# Patient Record
Sex: FEMALE | Race: BLACK OR AFRICAN AMERICAN
[De-identification: names, ages, dates, MRNs, and addresses within clinical notes are randomized per-mention and may not be internally consistent; named-entity substitution may affect disease eponyms.]

---

## 2017-04-19 NOTE — PD
HPI


Chief Complaint


vag bleeding


Date Seen:  2017


Travel History


International Travel<30 Days:  No


Contact w/Intl Traveler<30Days:  No


Known Affected Area:  No





History of Present Illness


HPI


Pt is a 21 year old G1 with IUP at 18 wks who presents with c/o vag bleeding on 

and off for several weeks.  Pt states last intercourse 1 week ago.  Pt reports 

bleeding is light, pinkish, only with wiping.  denies cramping at present.  

reports h/o chlamydia earlier this pregnancy, treated


Para:  0


:  1





History


Past Surgical History


Surgical History:  No Previous Surgery





Social History


Alcohol Use:  No


Tobacco Use:  No


Substance Abuse:  No





Allergies-Medications


(Allergen,Severity, Reaction):  


Coded Allergies:  


     No Known Allergies (Unverified , 17)


Narrative Medication


prenatal vitamins





Review of Systems


General / Constitutional:  No: Fever, Weight Gain, Weight Loss, Chills, Other


Eyes:  No: Diploplia, Blurred Vision, Visual changes, Pain, Photophobia, Other


HENT:  No: Headaches, Vertigo, Dental Difficulties, Lightheadedness, Other


Cardiovascular:  No: Irregular Rhythm, Chest Pain or Discomfort, Palpitations, 

Tachycardia, Syncope, Varicosities, Edema, Cyanosis, Other


Respiratory:  No: Cough, Short of Breath, Wheezing, Other


Gastrointestinal:  No: Nausea, Vomiting, Diarrhea, Abdominal Pain, Hematemesis, 

Hematochezia, Constipation, Changes in Bowel Habits, Indigestion, Loss of 

Appetite, Other


Genitourinary:  No: Urgency, Frequency, Dysuria, Nocturia, Hematuria, Decreased 

Urinary Output, Oliguria, Hesitancy, Dribbling, Incontinence, Pelvic Pain, 

Dyspareunia, Discharge, Menorrhagia, Vaginal Bleeding, Other


Musculoskeletal:  No: Limited ROM, Weakness, Cramping, Edema, Pain, Other


Skin:  No Rash, No Itching, No Dryness, No Lumps, No Change in Pigmentation, No 

Change in Nails, No Alopecia, No Lesions, No Breast Lumps, No Breast Tenderness

, No Breast Swelling, No Other


Neurologic:  No: Weakness, Dizziness, Syncope, Focal Abnormalities, 

Coordination Problem, Headache, Slurred Speech, Seizures, Other


Psychiatric:  No: Anxiety, Depression, Suicidal Ideations, Disorder of Thought, 

Mood Disorder, Substance Abuse, Homicidal Ideation, Other


Endocrine:  No: Heat Intolerance, Cold Intolerance, Polydipsia, Polyuria, Other


Hematologic/Lymphatic:  No Easy Bruising, No Lymph Node Enlargement, No Other





Physical Exam


Narrative


GENERAL: Well-nourished, well-developed patient.


SKIN: Warm and dry.


HEAD: Normocephalic and atraumatic.


EYES: No scleral icterus. No injection or drainage. 


ENT: No nasal drainage noted. Mucous membranes pink. Airway patent.


NECK: Supple, trachea midline. No JVD.


CARDIOVASCULAR: Regular rate and rhythm without murmurs, gallops, or rubs. 


RESPIRATORY: Breath sounds equal bilaterally. No accessory muscle use.


.


ABDOMEN/GI: Abdomen soft, non-tender, bowel sounds present, no rebound, no 

guarding 


gravid


GENITOURINARY: 


   External Genitalia: intact and normal in appearance


   BUS glands: [wnl]


   Cervix: visually closed, scant amount of dark red blood in vault


closed/firm      


   Membranes: intact


   Uterine Contractions: none


FHT's: 


   150s


EXTREMITIES: No cyanosis or edema.


BACK: Nontender without obvious deformity. No CVA tenderness.


NEUROLOGICAL: Awake and alert. Motor and sensory grossly within normal limits. 

Five out of 5 muscle strength in all muscle groups. Normal speech.





Data


Data


Vital Signs Reviewed:  Yes


Orders





 Vital Signs (Adult) .ON ADMISSION (17 17:53)


^ Labor Status (17 17:53)


^ Hydration (17 17:53)


Wet Prep Profile (17 17:53)


Gc And Chlamydia Pcr (17 17:53)


Labs





Laboratory Tests








Test 17





 17:50


 


Clue Cells (Wet Prep) NONE SEEN 


 


Vaginal Trichomonas (Wet Prep) PRESENT 


 


Vaginal Yeast (Wet Prep) NONE SEEN 


 


Chlamydia trachomatis DNA NOT DETECTED 





(PCR) 


 


Neisseria gonorrhoeae DNA NOT DETECTED 





(PCR) 











MDM


Narrative Course / MDM


20 y/o G1 with IUP at 18 wks with vag spotting, trichomonas on wet prep


rx for flagyl


instructions given to patient


pt advised that partner needs to be treated, no sex for one week after both 

have completed rx


Diagnosis


Diagnosis:  


 Primary Impression:  


 Trichomonal vaginitis during pregnancy in second trimester


 Additional Impression:  


 18 weeks gestation of pregnancy


Disposition:   DISCHARGE HOME


Condition:  Stable


Scripts


Metronidazole (Flagyl)500 Mg Oep256 Mg PO ONCE  1 Day  Ref 4


   Prov:Dhaval Mead MD         17 


Metronidazole (Flagyl)500 Mg Gtx642 Mg PO QID  1 Day  Ref 4


   Prov:Dhaval Mead MD         17


Patient Instructions:  General Instructions





**Additional Instructions**:


DRINK PLENTY OF WATER DURING DAY, RETURN IF LEAKING FLUID, VAGINAL BLEEDING, 

STRONG CONTRACTIONS OR DECREASE IN FETAL MOVEMENT. FOLLOW UP WITH YOUR OB DR IN 

AM AND KEEP ALL UPCOMING APPTS.


Departure Forms:  Tests/Procedures








Dhaval Mead MD 2017 19:53

## 2017-04-28 NOTE — PD
HPI


Chief Complaint


vaginal bleeding


Date Seen:  2017 (Roxana Mixon MD R2)





Travel History


International Travel<30 Days:  No


Contact w/Intl Traveler<30Days:  No (Roxana Mixon MD R2)





History of Present Illness


HPI


Patient is a 21 year old  at 19-6/7 weeks gestation who presents today for 

vaginal bleeding. She states that she has had vaginal bleeding for the past 

month of pregnancy.  She was evaluated in the OB ED on  and treated for 

Trichomoniasis.  She continues to have the vaginal bleeding and noticed a clot 

today.  She denies any other vaginal discharge, gush or leaking of fluid, or 

cramping.  Last intercourse was 1-2 weeks ago.  Prenatal care with Nedra Burgos. (Roxana Mixon MD R2)





History


Past Medical History


Medical History:  Denies Significant Hx (Roxana Mixon MD)





Obstetric History


Obstetric History


 (Roxana Mixon MD)





Past Surgical History


Surgical History:  No Previous Surgery (Roxana Mixon MD)





Family History


Family History:  Negative (Roxana Mixon MD)





Social History


Alcohol Use:  No


Tobacco Use:  No


Substance Abuse:  No (Roxana Mixon MD)





Allergies-Medications


(Allergen,Severity, Reaction):  


Coded Allergies:  


     No Known Allergies (Unverified , 17)


Home Meds


Active Scripts


Metronidazole Vaginal Gel (Metrogel Vaginal Gel)0.75 % Gel1 Appl VAGINAL BID  #

1 TUBE  Ref 0


   Apply 2x/day for 5 days


   Prov:Roxana Mixon MD         17


Metronidazole (Flagyl)500 Mg Imp077 Mg PO ONCE  1 Day  Ref 4


   Prov:Dhaval Mead MD         17


Metronidazole (Flagyl)500 Mg Siz914 Mg PO QID  1 Day  Ref 4


   Prov:Dhaval Mead MD         17


Discontinued Scripts


Metronidazole Vaginal Gel (Metrogel Vaginal Gel)0.75 % Gel1 Appl VAGINAL BID  #

1 TUBE  Ref 0


   Apply 2x/day for 5 days


   Prov:Roxana Mixon MD         17


Metronidazole Vaginal Gel (Metrogel Vaginal Gel)0.75 % Gel1 Appl VAGINAL BID  #

1 TUBE  Ref 0


   Prov:Roxana Mixon MD         17


Metronidazole Vaginal Gel (Metrogel Vaginal Gel)0.75 % Gel1 Appl VAGINAL BID  #

1 TUBE  Ref 0


   Prov:Roxana Mixon MD R2         17





Review of Systems


Except as stated in HPI:  all other systems reviewed are Neg


General / Constitutional:  No: Fever, Chills


Eyes:  No: Blurred Vision, Visual changes


HENT:  No: Headaches


Cardiovascular:  No: Chest Pain or Discomfort


Respiratory:  No: Cough, Short of Breath


Gastrointestinal:  No: Nausea, Vomiting


Genitourinary:  No: Dysuria


Musculoskeletal:  No: Cramping, Edema (Roxana Mixon MD R2)





Physical Exam


Narrative


GENERAL: Well-nourished, well-developed patient.


SKIN: Warm and dry.


HEAD: Normocephalic and atraumatic.


EYES: No scleral icterus. No injection or drainage. 


ENT: No nasal drainage noted. Mucous membranes pink. Airway patent.


NECK: Supple, trachea midline. No JVD.


CARDIOVASCULAR: Regular rate and rhythm without murmurs, gallops, or rubs. 


RESPIRATORY: Breath sounds equal bilaterally. No accessory muscle use.


BREASTS: Bilateral exam showed no masses , no retractions, no nipple discharge.


ABDOMEN/GI: Abdomen soft, non-tender, bowel sounds present, no rebound, no 

guarding 


   Gravid to 19 weeks size


GENITOURINARY: 


Speculum Exam: Cervix is closed. Inferior portion appears strawberry-like with 

active bleeding. Copious greenish frothy discharge also present. Cervical 

motion tenderness on manual exam.


   External Genitalia: intact and normal in appearance


   BUS glands: normal


   Cervix: posterior


   Dilatation: 0          


   Effacement: 0          


   Station: -3  


   Presentation: vertex        


   Membranes: intact


   Uterine Contractions: none


FHT's: 156


EXTREMITIES: No cyanosis or edema.


BACK: Nontender without obvious deformity. No CVA tenderness.


NEUROLOGICAL: Awake and alert. Motor and sensory grossly within normal limits. 

Normal speech. (Roxana Mixon MD R2)





Data


Data


Vital Signs Reviewed:  Yes


Orders





 Vital Signs (Adult) .ON ADMISSION (17 00:05)


^ Labor Status (17 00:05)


Urinalysis - C+S If Indicated (17 00:05)


^ Hydration (17 00:05)


Wet Prep Profile (17 00:05)


Gc And Chlamydia Pcr (17 00:05) (Roxana Mixon MD R2)





MDM


Medical Record Reviewed:  Yes


Narrative Course / MDM


21 year old  at 19-6/7 weeks gestation.





1. IUP- FHT reassuring


2. Vaginal Bleeding- speculum exam significant for strawberry cervix with 

active bleeding and green, frothy discharge. Obtain wet prep and gc and 

chlamydia.


Bedside US reassuring:


Baby appears active with .  Vertex. Anterior grade 1 placenta, no 

abruption, no previa. Fluid wnl. 





dw Dr. Neves





Addendum:


Patient is positive for Trichomonas. Will treat with Flagyl 2g PO once.  

Discharge home on Metrogel BID x 5 days.


GC and chlamydia negative.


Follow-up with Nedra Burgos. (Roxana Mixon MD R2)


Disposition:   DISCHARGE HOME


Condition:  Stable


Scripts


Metronidazole Vaginal Gel (Metrogel Vaginal Gel)0.75 % Gel1 Appl VAGINAL BID  #

1 TUBE  Ref 0


   Apply 2x/day for 5 days


   Prov:Roxana Mixon MD R2         17





Addendum


Remarks


I rounded on the patient. I rounded with the resident. I reviewed the resident'

s assessment and plan of care for this patient. I am in agreement with the plan 

of care for this patient. (Lolis Montanez MD)








Roxana Mixon MD R2 2017 00:07


Lolis Montanez MD 2017 14:24

## 2017-09-08 ENCOUNTER — HOSPITAL ENCOUNTER (EMERGENCY)
Dept: HOSPITAL 17 - HOBED | Age: 22
Discharge: HOME | End: 2017-09-08
Payer: MEDICAID

## 2017-09-08 DIAGNOSIS — Z3A.39: ICD-10-CM

## 2017-09-08 DIAGNOSIS — O47.1: Primary | ICD-10-CM

## 2017-09-08 PROCEDURE — 99283 EMERGENCY DEPT VISIT LOW MDM: CPT

## 2017-09-08 NOTE — PD
HPI


Chief Complaint


Contractions


Date Seen:  Sep 8, 2017


Time Seen:  22:00


Travel History


International Travel<30 Days:  No


Contact w/Intl Traveler<30Days:  No


Known Affected Area:  No





History of Present Illness


HPI


22-year-old primigravida at 39 weeks 5 days gestation who has been having which 

she thinks are contractions for the last week.  She denies bleeding, leakage of 

fluid and reports good fetal movement.


Weeks Gestation:  39


Para:  0


:  1





History


Past Medical History


Medical History:  Denies Significant Hx





Obstetric History


Obstetric History


Primigravida, she sees Nedra Burgos





Family History


Family History:  Negative





Social History


Alcohol Use:  No


Tobacco Use:  No


Substance Abuse:  No





Allergies-Medications


(Allergen,Severity, Reaction):  


Coded Allergies:  


     No Known Allergies (Unverified , 17)


Home Meds


Active Scripts


Metronidazole Vaginal Gel (Metrogel Vaginal Gel) 0.75 % Gel, 1 APPL VAGINAL BID 

for Infection, #1 TUBE 0 Refills


   Apply 2x/day for 5 days


   Prov:Roxana Mixon MD, R3         17


Metronidazole (Flagyl) 500 Mg Tab, 500 MG PO ONCE for Infection for 1 Day, TAB 

4 Refills


   Prov:Dhaval Mead MD         17


Metronidazole (Flagyl) 500 Mg Tab, 500 MG PO QID for Infection for 1 Day, TAB 4 

Refills


   Prov:Dhaval Mead MD         17





Review of Systems


Except as stated in HPI:  all other systems reviewed are Neg





Physical Exam


Narrative


GENERAL: Well-nourished, well-developed patient.


SKIN: Warm and dry.


HEAD: Normocephalic and atraumatic.


EYES: No scleral icterus. No injection or drainage. 


ENT: No nasal drainage noted. Mucous membranes pink. Airway patent.


NECK: Supple, trachea midline. No JVD.


CARDIOVASCULAR: Regular rate and rhythm without murmurs, gallops, or rubs. 


RESPIRATORY: Breath sounds equal bilaterally. No accessory muscle use.


ABDOMEN/GI: Abdomen soft, non-tender, bowel sounds present, no rebound, no 

guarding 


   Gravid to [-] weeks size


   Fundal Height: [-]


GENITOURINARY: 


   External Genitalia: intact and normal in appearance


   BUS glands: [Negative-]


   Cervix: [-]


   Dilatation: [-Fingertip]          


   Effacement: [60-]          


   Station: [--2]  


   Presentation: [v-]        


   Membranes: [intact]


   Uterine Contractions: [-]


FHT's: 


   Category: [-]   


   Baseline: [-]   


   Reactive: [y-]   


   Variability: [-]  


   Decels: [-]  


EXTREMITIES: No cyanosis or edema.


BACK: Nontender without obvious deformity. No CVA tenderness.


NEUROLOGICAL: Awake and alert. Motor and sensory grossly within normal limits. 

Five out of 5 muscle strength in all muscle groups. Normal speech.





MDM


Medical Record Reviewed:  Yes


Narrative Course / MDM


Assessment: 39+ week intrauterine pregnancy with uterine irritability





Plan: Labor precautions were reviewed.  Hydration was encouraged.  Follow-up is 

scheduled for prenatal visit.


Disposition:  01 DISCHARGE HOME


Condition:  Good


Patient Instructions:  General Instructions, Having Your Baby: The Labor 

Process (GEN), Fetal Movement (ED)





**Additional Instructions**:  


DRINK PLENTY OF WATER DURING DAY, RETURN IF LEAKING FLUID, VAGINAL BLEEDING, 

DECREASE IN BABY MOVING OR STRONG CONTRACTIONS. FOLLOW UP WITH YOUR OB DR IN 

THE AM AND KEEP ALL UPCOMING OB APPTS.











Yoandy Gray MD Sep 8, 2017 22:24

## 2017-09-11 ENCOUNTER — HOSPITAL ENCOUNTER (INPATIENT)
Dept: HOSPITAL 17 - HOBED | Age: 22
LOS: 3 days | Discharge: HOME | End: 2017-09-14
Attending: OBSTETRICS & GYNECOLOGY | Admitting: OBSTETRICS & GYNECOLOGY
Payer: MEDICAID

## 2017-09-11 VITALS — RESPIRATION RATE: 16 BRPM

## 2017-09-11 VITALS
DIASTOLIC BLOOD PRESSURE: 86 MMHG | RESPIRATION RATE: 16 BRPM | HEART RATE: 80 BPM | SYSTOLIC BLOOD PRESSURE: 144 MMHG | TEMPERATURE: 100.1 F

## 2017-09-11 VITALS — HEART RATE: 77 BPM | SYSTOLIC BLOOD PRESSURE: 137 MMHG | DIASTOLIC BLOOD PRESSURE: 77 MMHG

## 2017-09-11 VITALS — OXYGEN SATURATION: 92 %

## 2017-09-11 VITALS — DIASTOLIC BLOOD PRESSURE: 70 MMHG | SYSTOLIC BLOOD PRESSURE: 122 MMHG | HEART RATE: 96 BPM

## 2017-09-11 VITALS
DIASTOLIC BLOOD PRESSURE: 73 MMHG | SYSTOLIC BLOOD PRESSURE: 129 MMHG | HEART RATE: 97 BPM | OXYGEN SATURATION: 96 % | RESPIRATION RATE: 20 BRPM

## 2017-09-11 VITALS
OXYGEN SATURATION: 100 % | HEART RATE: 107 BPM | SYSTOLIC BLOOD PRESSURE: 132 MMHG | RESPIRATION RATE: 20 BRPM | DIASTOLIC BLOOD PRESSURE: 69 MMHG

## 2017-09-11 VITALS — DIASTOLIC BLOOD PRESSURE: 78 MMHG | HEART RATE: 69 BPM | SYSTOLIC BLOOD PRESSURE: 132 MMHG

## 2017-09-11 VITALS
DIASTOLIC BLOOD PRESSURE: 63 MMHG | OXYGEN SATURATION: 100 % | RESPIRATION RATE: 20 BRPM | TEMPERATURE: 97.6 F | SYSTOLIC BLOOD PRESSURE: 120 MMHG | HEART RATE: 121 BPM

## 2017-09-11 VITALS — HEART RATE: 79 BPM | SYSTOLIC BLOOD PRESSURE: 114 MMHG | DIASTOLIC BLOOD PRESSURE: 85 MMHG

## 2017-09-11 VITALS — DIASTOLIC BLOOD PRESSURE: 71 MMHG | SYSTOLIC BLOOD PRESSURE: 124 MMHG | HEART RATE: 75 BPM

## 2017-09-11 VITALS — HEART RATE: 84 BPM | DIASTOLIC BLOOD PRESSURE: 86 MMHG | SYSTOLIC BLOOD PRESSURE: 131 MMHG

## 2017-09-11 VITALS
SYSTOLIC BLOOD PRESSURE: 126 MMHG | RESPIRATION RATE: 19 BRPM | OXYGEN SATURATION: 100 % | HEART RATE: 112 BPM | DIASTOLIC BLOOD PRESSURE: 76 MMHG

## 2017-09-11 VITALS
TEMPERATURE: 98.6 F | RESPIRATION RATE: 18 BRPM | HEART RATE: 75 BPM | OXYGEN SATURATION: 99 % | DIASTOLIC BLOOD PRESSURE: 81 MMHG | SYSTOLIC BLOOD PRESSURE: 138 MMHG

## 2017-09-11 VITALS — HEART RATE: 83 BPM

## 2017-09-11 VITALS
HEART RATE: 82 BPM | OXYGEN SATURATION: 100 % | RESPIRATION RATE: 14 BRPM | SYSTOLIC BLOOD PRESSURE: 134 MMHG | DIASTOLIC BLOOD PRESSURE: 71 MMHG

## 2017-09-11 VITALS — HEART RATE: 84 BPM

## 2017-09-11 VITALS
DIASTOLIC BLOOD PRESSURE: 81 MMHG | RESPIRATION RATE: 24 BRPM | HEART RATE: 120 BPM | SYSTOLIC BLOOD PRESSURE: 126 MMHG | OXYGEN SATURATION: 99 %

## 2017-09-11 VITALS — HEART RATE: 92 BPM

## 2017-09-11 VITALS — HEART RATE: 89 BPM

## 2017-09-11 VITALS — WEIGHT: 149.91 LBS | BODY MASS INDEX: 27.59 KG/M2 | HEIGHT: 62 IN

## 2017-09-11 VITALS — HEART RATE: 76 BPM | SYSTOLIC BLOOD PRESSURE: 134 MMHG | DIASTOLIC BLOOD PRESSURE: 80 MMHG

## 2017-09-11 VITALS — HEART RATE: 77 BPM | SYSTOLIC BLOOD PRESSURE: 107 MMHG | DIASTOLIC BLOOD PRESSURE: 52 MMHG

## 2017-09-11 VITALS — HEART RATE: 78 BPM

## 2017-09-11 VITALS — DIASTOLIC BLOOD PRESSURE: 72 MMHG | SYSTOLIC BLOOD PRESSURE: 107 MMHG | HEART RATE: 128 BPM

## 2017-09-11 VITALS — HEART RATE: 80 BPM

## 2017-09-11 VITALS — HEART RATE: 86 BPM

## 2017-09-11 VITALS — OXYGEN SATURATION: 93 %

## 2017-09-11 VITALS — OXYGEN SATURATION: 96 %

## 2017-09-11 VITALS — HEART RATE: 85 BPM

## 2017-09-11 VITALS — TEMPERATURE: 97.6 F

## 2017-09-11 DIAGNOSIS — Z3A.40: ICD-10-CM

## 2017-09-11 LAB
BACTERIA #/AREA URNS HPF: (no result) /HPF
BASE EXCESS BLD CALC-SCNC: -3 MMOL/L (ref -2–2)
BASOPHILS # BLD AUTO: 0 TH/MM3 (ref 0–0.2)
BASOPHILS NFR BLD: 0.2 % (ref 0–2)
BENZODIAZEPINES PNL UR: 44 % (ref 90–100)
COLOR UR: YELLOW
COMMENT (UR): (no result)
CULTURE IF INDICATED: (no result)
DRAW SITE: (no result)
EOSINOPHIL # BLD: 0.1 TH/MM3 (ref 0–0.4)
EOSINOPHIL NFR BLD: 0.6 % (ref 0–4)
ERYTHROCYTE [DISTWIDTH] IN BLOOD BY AUTOMATED COUNT: 13.8 % (ref 11.6–17.2)
GLUCOSE UR STRIP-MCNC: (no result) MG/DL
HCO3 BLDCO-SCNC: 23 MMOL/L (ref 21–29)
HCT VFR BLD CALC: 36 % (ref 35–46)
HEMO FLAGS: (no result)
HGB UR QL STRIP: (no result)
KETONES UR STRIP-MCNC: (no result) MG/DL
LYMPHOCYTES # BLD AUTO: 2.8 TH/MM3 (ref 1–4.8)
LYMPHOCYTES NFR BLD AUTO: 27.6 % (ref 9–44)
MCH RBC QN AUTO: 29 PG (ref 27–34)
MCHC RBC AUTO-ENTMCNC: 32.7 % (ref 32–36)
MCV RBC AUTO: 88.8 FL (ref 80–100)
MONOCYTES NFR BLD: 5.9 % (ref 0–8)
MUCOUS THREADS #/AREA URNS LPF: (no result) /LPF
NEUTROPHILS # BLD AUTO: 6.6 TH/MM3 (ref 1.8–7.7)
NEUTROPHILS NFR BLD AUTO: 65.7 % (ref 16–70)
NITRITE UR QL STRIP: (no result)
PCO2 BLDCOA: 47 MMHG (ref 34–78)
PH BLDA: 7.3 [PH] (ref 7.14–7.42)
PLATELET # BLD: 176 TH/MM3 (ref 150–450)
PO2 BLDCO: 23 MMHG (ref 3–40)
RBC # BLD AUTO: 4.05 MIL/MM3 (ref 4–5.3)
SP GR UR STRIP: 1.02 (ref 1–1.03)
SQUAMOUS #/AREA URNS HPF: 3 /HPF (ref 0–5)
STAT: YES
WBC # BLD AUTO: 10 TH/MM3 (ref 4–11)

## 2017-09-11 PROCEDURE — 86592 SYPHILIS TEST NON-TREP QUAL: CPT

## 2017-09-11 PROCEDURE — 85025 COMPLETE CBC W/AUTO DIFF WBC: CPT

## 2017-09-11 PROCEDURE — 76815 OB US LIMITED FETUS(S): CPT

## 2017-09-11 PROCEDURE — 81001 URINALYSIS AUTO W/SCOPE: CPT

## 2017-09-11 PROCEDURE — 86900 BLOOD TYPING SEROLOGIC ABO: CPT

## 2017-09-11 PROCEDURE — 99285 EMERGENCY DEPT VISIT HI MDM: CPT

## 2017-09-11 PROCEDURE — 86901 BLOOD TYPING SEROLOGIC RH(D): CPT

## 2017-09-11 PROCEDURE — 59025 FETAL NON-STRESS TEST: CPT

## 2017-09-11 PROCEDURE — 00HU33Z INSERTION OF INFUSION DEVICE INTO SPINAL CANAL, PERCUTANEOUS APPROACH: ICD-10-PCS | Performed by: OBSTETRICS & GYNECOLOGY

## 2017-09-11 PROCEDURE — 3E0R33Z INTRODUCTION OF ANTI-INFLAMMATORY INTO SPINAL CANAL, PERCUTANEOUS APPROACH: ICD-10-PCS | Performed by: OBSTETRICS & GYNECOLOGY

## 2017-09-11 PROCEDURE — 82805 BLOOD GASES W/O2 SATURATION: CPT

## 2017-09-11 RX ADMIN — OXYTOCIN SCH MLS/HR: 10 INJECTION, SOLUTION INTRAMUSCULAR; INTRAVENOUS at 21:18

## 2017-09-11 RX ADMIN — OXYTOCIN SCH MLS/HR: 10 INJECTION, SOLUTION INTRAMUSCULAR; INTRAVENOUS at 16:40

## 2017-09-11 NOTE — PD
HPI


Chief Complaint


Contractions


Date Seen:  Sep 11, 2017


Time Seen:  16:13


Travel History


International Travel<30 Days:  No


Contact w/Intl Traveler<30Days:  No


Known Affected Area:  No





History of Present Illness


HPI


22-year-old  who is at 40 weeks and 2 days comes in complaining of 

contractions that began early this morning and had progressed in intensity.  

Patient denies rupture of membranes or vaginal bleeding.  Gestation states that 

she's had normal fetal movement and she is group B strep positive.  She 

obtained her prenatal care with Nedra Burgos.


Weeks Gestation:  40


Para:  0


:  1





History


Past Medical History


Medical History:  Denies Significant Hx





Past Surgical History


Surgical History:  No Previous Surgery





Family History


Family History:  Negative





Social History


Alcohol Use:  No


Tobacco Use:  No


Substance Abuse:  No





Allergies-Medications


(Allergen,Severity, Reaction):  


Coded Allergies:  


     No Known Allergies (Unverified , 17)


Home Meds


Active Scripts


Metronidazole Vaginal Gel (Metrogel Vaginal Gel) 0.75 % Gel, 1 APPL VAGINAL BID 

for Infection, #1 TUBE 0 Refills


   Apply 2x/day for 5 days


   Prov:Roxana Mixon MD, R3         17


Metronidazole (Flagyl) 500 Mg Tab, 500 MG PO ONCE for Infection for 1 Day, TAB 

4 Refills


   Prov:Dhaval Mead MD         17


Metronidazole (Flagyl) 500 Mg Tab, 500 MG PO QID for Infection for 1 Day, TAB 4 

Refills


   Prov:Dhaval Mead MD         17





Review of Systems


Except as stated in HPI:  all other systems reviewed are Neg





Physical Exam


Narrative


GENERAL: Well-nourished, well-developed patient.


SKIN: Warm and dry.


HEAD: Normocephalic and atraumatic.


EYES: No scleral icterus. No injection or drainage. 


ENT: No nasal drainage noted. Mucous membranes pink. Airway patent.


NECK: Supple, trachea midline. No JVD.


CARDIOVASCULAR: Regular rate and rhythm without murmurs, gallops, or rubs. 


RESPIRATORY: Breath sounds equal bilaterally. No accessory muscle use.


BREASTS: Bilateral exam showed no masses , no retractions, no nipple discharge.


ABDOMEN/GI: Abdomen soft, non-tender, bowel sounds present, no rebound, no 

guarding 


   Gravid to [-40] weeks size


   Fundal Height: [-]


GENITOURINARY: 


   External Genitalia: intact and normal in appearance


   BUS glands: [-Normal]


   Cervix: [-Mid position]


   Dilatation: [-8 and 9]          


   Effacement: [-80]          


   Station: [--2]  


   Presentation: [-Vertex]        


   Membranes: [intact ]


   Uterine Contractions: [Every 5 minutes-]


FHT's: 


   Category: [1-]   


   Baseline: [-140]   


   Reactive: [mod-]   


   Variability: [-mod]  


   Decels: [absent-]  


EXTREMITIES: No cyanosis or edema.


BACK: Nontender without obvious deformity. No CVA tenderness.


NEUROLOGICAL: Awake and alert. Motor and sensory grossly within normal limits. 

Five out of 5 muscle strength in all muscle groups. Normal speech.





Data


Data


Vital Signs Reviewed:  Yes


Orders





 Orders


Ob (2e) Additional Admit Info (17 16:10)





[facility]


Medical Record Reviewed:  Yes


Plan


21yo  at 40w2d here in active labor


GBS positive- will start PCN although doubt there will be time for more than a 

single dose


Diagnosis


Diagnosis:  


 Primary Impression:  


 40 weeks gestation of pregnancy


 Additional Impressions:  


 Positive GBS test


 Irregular uterine contractions











Salima Brooks MD Sep 11, 2017 16:20

## 2017-09-11 NOTE — HHI.PR
OB/GYN Note


Note


Varible decelerations noted from baseline of 140 to 90 x 45 sec.  Maternal 

hypotension noted s/p epidural treated with ephedrine with rapid increase in 

maternal blood pressure.  AROM performed with moderate meconium. Cervix 8/80/-2











Salima Brooks MD Sep 11, 2017 17:54

## 2017-09-11 NOTE — PD.OB.DELI
Procedure Note


 Section Procedure


Pre Op Diagnosis:  


(1) 40 weeks gestation of pregnancy


(2) Irregular uterine contractions


(3) Positive GBS test


(4) Meconium stained amniotic fluid, delivered, current hospitalization


(5) Non-reassuring fetal heart tones complicating pregnancy, antepartum


Post Op Diagnosis:  


Performed by


Salima Brooks


Procedure:  Primary Low Transverse  Sec


Indication for delivery:  Nonreassuring fetal heart tracing


Previous condition:  None


Informed consent obtained:  For anesthesia, For procedure


Confirmed correct:  Time-out taken


Anesthesia:  Epidural


Medication prior to procedure:  Antibiotics, IV


Monitoring during procedure:  Blood pressure monitoring, Cardiac monitor, Pulse 

oximetry


Urinary catheter:  To dependent drainage


Sterile preparation:  With 10% povidone iodine (Betadine)


Position:  Supine with wedge to left side





Operative Features


Skin Incision:  Pfannenstiel


Uterine Incision:  Low transverse w/knife / blunt ext


Membranes Ruptured:  Previously, Appearance of fluid (moderate meconium)


Presentation:  Occiput posterior


Delivery date:  Sep 11, 2017


Delivery time:  19:09


Delivery of infant:  Uneventful, Umbilical cord (nuchal x 2)


Infant:  Female


One Minute APGAR:  8


Five Minute APGAR:  8


Birth Weight:  3340


Status of infant:  Viable


Placenta delivered:  Intact


Estimated blood loss:  600cc


Procedure tolerated:  Well


Maternal Condition:  Stable


Baby Complications:  Respiratory distress


Fetal Condition:  Stable











Salima Brooks MD Sep 11, 2017 19:44

## 2017-09-12 VITALS
RESPIRATION RATE: 16 BRPM | SYSTOLIC BLOOD PRESSURE: 121 MMHG | DIASTOLIC BLOOD PRESSURE: 76 MMHG | HEART RATE: 71 BPM | TEMPERATURE: 97.9 F

## 2017-09-12 VITALS
SYSTOLIC BLOOD PRESSURE: 107 MMHG | TEMPERATURE: 98.1 F | HEART RATE: 85 BPM | DIASTOLIC BLOOD PRESSURE: 66 MMHG | RESPIRATION RATE: 16 BRPM

## 2017-09-12 VITALS
DIASTOLIC BLOOD PRESSURE: 75 MMHG | HEART RATE: 86 BPM | SYSTOLIC BLOOD PRESSURE: 131 MMHG | TEMPERATURE: 98.1 F | RESPIRATION RATE: 16 BRPM

## 2017-09-12 VITALS
TEMPERATURE: 99 F | DIASTOLIC BLOOD PRESSURE: 69 MMHG | SYSTOLIC BLOOD PRESSURE: 117 MMHG | HEART RATE: 86 BPM | RESPIRATION RATE: 18 BRPM

## 2017-09-12 VITALS
HEART RATE: 85 BPM | SYSTOLIC BLOOD PRESSURE: 125 MMHG | DIASTOLIC BLOOD PRESSURE: 78 MMHG | TEMPERATURE: 98.1 F | RESPIRATION RATE: 16 BRPM

## 2017-09-12 VITALS — TEMPERATURE: 98.8 F

## 2017-09-12 LAB
BASOPHILS # BLD AUTO: 0 TH/MM3 (ref 0–0.2)
BASOPHILS NFR BLD: 0.1 % (ref 0–2)
EOSINOPHIL # BLD: 0 TH/MM3 (ref 0–0.4)
EOSINOPHIL NFR BLD: 0.2 % (ref 0–4)
ERYTHROCYTE [DISTWIDTH] IN BLOOD BY AUTOMATED COUNT: 13.4 % (ref 11.6–17.2)
HCT VFR BLD CALC: 30.7 % (ref 35–46)
HEMO FLAGS: (no result)
LYMPHOCYTES # BLD AUTO: 2.1 TH/MM3 (ref 1–4.8)
LYMPHOCYTES NFR BLD AUTO: 20 % (ref 9–44)
MCH RBC QN AUTO: 29.1 PG (ref 27–34)
MCHC RBC AUTO-ENTMCNC: 33 % (ref 32–36)
MCV RBC AUTO: 88.4 FL (ref 80–100)
MONOCYTES NFR BLD: 6.2 % (ref 0–8)
NEUTROPHILS # BLD AUTO: 7.6 TH/MM3 (ref 1.8–7.7)
NEUTROPHILS NFR BLD AUTO: 73.5 % (ref 16–70)
PLATELET # BLD: 140 TH/MM3 (ref 150–450)
RBC # BLD AUTO: 3.47 MIL/MM3 (ref 4–5.3)
WBC # BLD AUTO: 10.3 TH/MM3 (ref 4–11)

## 2017-09-12 RX ADMIN — OXYCODONE HYDROCHLORIDE AND ACETAMINOPHEN PRN TAB: 5; 325 TABLET ORAL at 13:00

## 2017-09-12 RX ADMIN — OXYTOCIN SCH MLS/HR: 10 INJECTION, SOLUTION INTRAMUSCULAR; INTRAVENOUS at 02:54

## 2017-09-12 RX ADMIN — STANDARDIZED SENNA CONCENTRATE AND DOCUSATE SODIUM PRN TAB: 8.6; 5 TABLET, FILM COATED ORAL at 13:00

## 2017-09-12 RX ADMIN — IBUPROFEN PRN MG: 600 TABLET, FILM COATED ORAL at 13:01

## 2017-09-12 NOTE — HHI.OB
Subjective


Post Operative Day:  1


Remarks


Pt seen and examined this morning.Postoperative day # 1 AFVSS overnight. 

Incision with silver dressing, not draining. Decreased lochia. Denies dysuria. 

No breast tenderness. She is feeding the baby via breast. Appetite good. nausea 

and vomiting overnight. Patient has not yet had a bowel movement. Denies 

flatus. Has not yet attempted to ambulate. Denies calf pain, shortness of breath

, CP, HA or vision issues . Pt also complained of generalized itchiness.





Objective


Vitals/I&O





Vital Signs








  Date Time  Temp Pulse Resp B/P (MAP) Pulse Ox O2 Delivery O2 Flow Rate FiO2


 


17 08:14 98.1 85 16 107/66 (80)    


 


17 03:00 98.1 86 16 131/75 (93)    


 


17 01:30 98.8       


 


17 23:00  80  144/86 (105)    


 


17 23:00 100.1  16     


 


17 21:30  82  134/71 (92)    


 


17 21:30   14  100   


 


17 21:15  75 12 138/81 (100)    


 


17 21:15   18     


 


17 21:15 98.6       


 


17 21:15     99   


 


17 21:00    129/73 (91)    


 


17 21:00  97 20  96   


 


17 20:43 97.6       


 


17 20:41  107 20 132/69 (90) 100   


 


17 20:25  112 19 126/76 (93) 100   


 


17 20:08  120 24 126/81 (96) 99   


 


17 19:45 97.6 121 20 120/63 (82)    


 


17 19:45     100   


 


17 19:11   18     


 


17 18:52  96  122/70 (87)    


 


17 18:40  89      


 


17 18:35  63      


 


17 18:35  83      


 


17 18:31  92      


 


17 18:30  86      


 


17 18:25  80      


 


17 18:20  84      


 


17 18:16  78      


 


17 18:15  83      


 


17 17:55  86      


 


17 17:50  89      


 


17 17:46  128  107/72 (84)    


 


17 17:45  85      


 


17 17:44  77      


 


17 17:44    107/52 (70)    


 


17 17:40  79      


 


17 17:40    114/85 (95)    


 


17 17:40  82      


 


17 17:35    124/71 (88)    


 


17 17:35  75      


 


17 17:35  69      


 


17 17:30  69      


 


17 17:30    132/78 (96)    


 


17 17:30  72      


 


17 17:29   16     


 


17 17:25  84      


 


17 17:25  76  131/86 (101)    


 


17 17:20  77  137/77 (97)    


 


17 17:20  65      


 


17 17:19  76  134/80 (98)    














Intake & Output  


 


 17





 07:00 19:00


 


Intake Total 100 ml 


 


Balance 100 ml 


 


  


 


Intake IV Total 100 ml 








Result Diagram:  


17 1620





Objective Remarks


GENERAL: Well-nourished, well-developed patient.


CARDIOVASCULAR: Regular rate and rhythm without murmurs, gallops, or rubs. 


RESPIRATORY: Breath sounds equal bilaterally. No accessory muscle use.


ABDOMEN/GI: Abdomen soft, non-tender, bowel sounds present. 


   Incision: Clean, dry and intact.


   Fundus: Firm, non-tender at umbilicus.


GENITOURINARY: Light to moderate bleeding.


EXTREMITIES: No cyanosis or edema, non-tender, without signs of DVT.


Medications and IVs





Current Medications








 Medications


  (Trade)  Dose


 Ordered  Sig/Agustin


 Route  Start Time


 Stop Time Status Last Admin


 


 Lactated Ringer's  1,000 ml @ 


 125 mls/hr  Q8H


 IV  17 16:20


    17 21:18


 


 


 Lactated Ringer's  1,000 ml @ 


 3,000 mls/hr  Q20M PRN


 IV  17 16:20


    17 17:34


 


 


 Sodium Chloride  500 ml @ 


 1,000 mls/hr  ONCE  PRN


 IV  17 16:30


     


 


 


 Sodium Chloride  1,000 ml @ 


 100 mls/hr  Q10H PRN


 IV  17 16:40


     


 


 


  (Xylocaine 1%


 Inj (50 ml))  0.1 ml  UNSCH X1  PRN


 I-DERMAL  17 16:30


 17 16:29   


 


 


  (Bicitra Liq)  30 ml  ON  CALL


 PO  17 16:30


 9/15/17 16:29   


 


 


  (Zofran Inj)  4 mg  Q6H  PRN


 IV  17 16:30


     


 


 


  (fentaNYL INJ)  50 mcg  Q1H  PRN


 IV PUSH  17 16:30


     


 


 


  (fentaNYL INJ)  100 mcg  Q1H  PRN


 IV PUSH  17 16:30


     


 


 


 Penicillin G


 Potassium [id number]


 units/Sodium


 Chloride  100 ml @ 


 200 mls/hr  Q4H


 IV  17 20:30


    17 20:30


 


 


  (Xylocaine 1%


 Inj (50 ml))  10 ml  UNSCH X1  PRN


 INFIL  17 16:30


 17 16:29   


 


 


  (Muri-Lube Oil)  10 ml  UNSCH  PRN


 TOPICAL  17 16:30


     


 


 


 Miscellaneous


 Information  No systemic


 narcotics to be


 given except...  UNSCH  PRN


 .XX  17 16:00


 17 15:59   


 


 


 Miscellaneous


 Information  DO NOT


 ADMINISTER ANY


 ANTICOAGUL...  UNSCH  PRN


 .XX  17 16:00


 17 15:59   


 


 


 Fentanyl/


 Bupivacaine HCl  100 ml @ 0


 mls/hr  TITRATE


 EPIDURAL  17 16:00


    17 19:11


 


 


  (ePHEDrine/NS 25


 MG/5 ML SYR)  10 mg  UNSCH  PRN


 IV  17 18:45


 17 18:44  17 17:47


 


 


 Lactated Ringer's  1,000 ml @ 


 100 mls/hr  Q10H


 IV  17 00:44


 17 20:43  17 02:54


 


 


 Oxytocin  500 ml @ 


 100 mls/hr  UNSCH X1  PRN


 IV  17 05:45


 17 05:44   


 


 


  (NS Flush)  2 ml  BID


 IV FLUSH  17 21:00


     


 


 


  (NS Flush)  2 ml  UNSCH  PRN


 IV FLUSH  17 19:45


     


 


 


  (Mylicon Chew)  80 mg  QID  PRN


 PO  17 19:45


     


 


 


  (Motrin)  600 mg  Q6H  PRN


 PO  17 19:45


     


 


 


  (Toradol Inj)  30 mg  Q6H  PRN


 IM  17 19:45


 17 19:44   


 


 


  (Percocet  5-325


 Mg)  1 tab  Q4H  PRN


 PO  17 19:45


     


 


 


  (Percocet  5-325


 Mg)  2 tab  Q4H  PRN


 PO  17 19:45


     


 


 


 Cefazolin Sodium


 1000 mg/Sodium


 Chloride  100 ml @ 


 200 mls/hr  Q8H


 IV  17 03:00


 17 11:29  17 02:46


 


 


  (Lizy-Colace)  2 tab  Q12H  PRN


 PO  17 19:45


     


 


 


  (M-M-R Ii Inj)  0.5 ml  ONCE ONCE


 SQ  17 16:00


 17 16:01   


 


 


  (Boostrix Inj)  0.5 ml  ONCE ONCE


 IM  17 16:00


 17 16:01   


 


 


  (Zofran Inj)  4 mg  Q6H  PRN


 IV PUSH  17 19:45


    17 04:26


 











Assessment/Plan


Problem List:  


(1) Status post 


ICD Codes:  Z98.891 - History of uterine scar from previous surgery


Assessment and Plan


23 y/o female who is POD #1 s/p CXN. 


-Continue routine postpartum care. 


-Percocet and Motrin PRN pain.


-c/w Ancef  2 bags


-Encouraged OOB. Advised pelvic rest for 6 wks. Will need a f/u appt. in 1-2 

wks for incision check. 


-Re: birth ctrl, she would like discuss her options . 


-Anticipate discharge . 


-pending am labs, cbc


-will add benadryl to medications for itchiness





dw MD Livia Cuellar,Ismael CHUA MD R1 Sep 12, 2017 08:31

## 2017-09-13 VITALS
TEMPERATURE: 98.2 F | DIASTOLIC BLOOD PRESSURE: 77 MMHG | SYSTOLIC BLOOD PRESSURE: 116 MMHG | RESPIRATION RATE: 15 BRPM | HEART RATE: 84 BPM

## 2017-09-13 VITALS
SYSTOLIC BLOOD PRESSURE: 116 MMHG | HEART RATE: 91 BPM | DIASTOLIC BLOOD PRESSURE: 70 MMHG | TEMPERATURE: 98.6 F | RESPIRATION RATE: 16 BRPM

## 2017-09-13 RX ADMIN — IBUPROFEN PRN MG: 600 TABLET, FILM COATED ORAL at 01:34

## 2017-09-13 RX ADMIN — IBUPROFEN PRN MG: 600 TABLET, FILM COATED ORAL at 19:48

## 2017-09-13 RX ADMIN — OXYCODONE HYDROCHLORIDE AND ACETAMINOPHEN PRN TAB: 5; 325 TABLET ORAL at 01:34

## 2017-09-13 RX ADMIN — STANDARDIZED SENNA CONCENTRATE AND DOCUSATE SODIUM PRN TAB: 8.6; 5 TABLET, FILM COATED ORAL at 11:38

## 2017-09-13 RX ADMIN — OXYCODONE HYDROCHLORIDE AND ACETAMINOPHEN PRN TAB: 5; 325 TABLET ORAL at 11:38

## 2017-09-13 RX ADMIN — OXYCODONE HYDROCHLORIDE AND ACETAMINOPHEN PRN TAB: 5; 325 TABLET ORAL at 19:48

## 2017-09-13 RX ADMIN — STANDARDIZED SENNA CONCENTRATE AND DOCUSATE SODIUM PRN TAB: 8.6; 5 TABLET, FILM COATED ORAL at 01:40

## 2017-09-13 RX ADMIN — IBUPROFEN PRN MG: 600 TABLET, FILM COATED ORAL at 11:38

## 2017-09-13 NOTE — HHI.OB
Subjective


Post Operative Day:  2


Remarks


Pt seen and examined this morning. Postoperative  day # 2 AFVSS overnight. 

Incision not draining. Decreased lochia. Denies dysuria. No breast tenderness. 

She is feeding the baby via bottle. Appetite good. No nausea or vomiting. 

Patient has not yet had a bowel movement, but does endorse bowel gas. 

Ambulating well. Denies calf pain or shortness of breath. Otherwise, she is 

doing well this morning and has no other concerns.





Objective


Vitals/I&O





Vital Signs








  Date Time  Temp Pulse Resp B/P (MAP) Pulse Ox O2 Delivery O2 Flow Rate FiO2


 


17 08:40 98.6 91 16 116/70 (85)    


 


17 20:00 99.0 86 18 117/69 (85)    


 


17 16:18   16     


 


17 16:17   16     


 


17 15:57 97.9 71 16 121/76 (91)    


 


17 13:00  85  125/78 (94)    


 


17 13:00 98.1  16     








Result Diagram:  


17 1412





Objective Remarks


GENERAL: Well-nourished, well-developed patient.


CARDIOVASCULAR: Regular rate and rhythm without murmurs, gallops, or rubs. 


RESPIRATORY: Breath sounds equal bilaterally. No accessory muscle use.


ABDOMEN/GI: Abdomen soft, non-tender, bowel sounds present. 


   Incision: Clean, dry and intact.


   Fundus: Firm, non-tender at umbilicus.


GENITOURINARY: Light to moderate bleeding.


EXTREMITIES: No cyanosis or edema, non-tender, without signs of DVT.


Medications and IVs





Current Medications








 Medications


  (Trade)  Dose


 Ordered  Sig/Agustin


 Route  Start Time


 Stop Time Status Last Admin


 


 Lactated Ringer's  1,000 ml @ 


 125 mls/hr  Q8H


 IV  17 16:20


    17 21:18


 


 


 Lactated Ringer's  1,000 ml @ 


 3,000 mls/hr  Q20M PRN


 IV  17 16:20


    17 17:34


 


 


 Sodium Chloride  500 ml @ 


 1,000 mls/hr  ONCE  PRN


 IV  17 16:30


     


 


 


 Sodium Chloride  1,000 ml @ 


 100 mls/hr  Q10H PRN


 IV  17 16:40


     


 


 


  (Xylocaine 1%


 Inj (50 ml))  0.1 ml  UNSCH X1  PRN


 I-DERMAL  17 16:30


 17 16:29   


 


 


  (Bicitra Liq)  30 ml  ON  CALL


 PO  17 16:30


 9/15/17 16:29   


 


 


  (Zofran Inj)  4 mg  Q6H  PRN


 IV  17 16:30


    17 15:46


 


 


  (fentaNYL INJ)  50 mcg  Q1H  PRN


 IV PUSH  17 16:30


     


 


 


  (fentaNYL INJ)  100 mcg  Q1H  PRN


 IV PUSH  17 16:30


     


 


 


 Penicillin G


 Potassium [id number]


 units/Sodium


 Chloride  100 ml @ 


 200 mls/hr  Q4H


 IV  17 20:30


    17 20:30


 


 


  (Xylocaine 1%


 Inj (50 ml))  10 ml  UNSCH X1  PRN


 INFIL  17 16:30


 17 16:29   


 


 


  (Muri-Lube Oil)  10 ml  UNSCH  PRN


 TOPICAL  17 16:30


     


 


 


 Fentanyl/


 Bupivacaine HCl  100 ml @ 0


 mls/hr  TITRATE


 EPIDURAL  17 16:00


    17 19:11


 


 


  (NS Flush)  2 ml  BID


 IV FLUSH  17 21:00


     


 


 


  (NS Flush)  2 ml  UNSCH  PRN


 IV FLUSH  17 19:45


     


 


 


  (Mylicon Chew)  80 mg  QID  PRN


 PO  17 19:45


     


 


 


  (Motrin)  600 mg  Q6H  PRN


 PO  17 19:45


    17 01:34


 


 


  (Percocet  5-325


 Mg)  1 tab  Q4H  PRN


 PO  17 19:45


     


 


 


  (Percocet  5-325


 Mg)  2 tab  Q4H  PRN


 PO  17 19:45


    17 01:34


 


 


  (Lizy-Colace)  2 tab  Q12H  PRN


 PO  17 19:45


    17 01:40


 


 


  (Zofran Inj)  4 mg  Q6H  PRN


 IV PUSH  17 19:45


    17 04:26


 


 


  (Benadryl)  50 mg  Q6H  PRN


 PO  17 09:00


     


 











Assessment/Plan


Problem List:  


(1) Status post 


ICD Codes:  Z98.891 - History of uterine scar from previous surgery


Assessment and Plan


23 y/o female who is POD #2 s/p CXN. 


-Continue routine postpartum care. 


-Percocet and Motrin PRN pain.


-Post-op H/H: 10/30


-S/P Ancef  2 bags post-op


-Encouraged OOB. Advised pelvic rest for 6 wks. Will need a f/u appt. in 1-2 

wks for incision check and bandage removal. 


-Re: birth ctrl, she would like discuss her options at her f/u appointment. 


-Anticipate discharge . 


-Benadryl to medications for itchiness





dw Dr. Michele MD


Discharge Planning


Likely tomorrow, pending clinical course











Michael Lind MD R2 Sep 13, 2017 10:06

## 2017-09-13 NOTE — MP
cc:

BRADLEY LYON M.D.

****

 

 

DATE OF SURGERY

2017

 

PREOPERATIVE DIAGNOSIS

1.   40 weeks gestation

2. Non-reassuring fetal heart rate tracing.

3. Moderate meconium.

4. Group B strep positive.

 

POSTOPERATIVE DIAGNOSIS

1.   40 weeks gestation

2. Non-reassuring fetal heart rate tracing.

3. Moderate meconium.

4. Group B strep positive.

 

PROCEDURE

Primary low transverse  section without extension

 

ESTIMATED BLOOD LOSS

500 cc

 

ANESTHESIA

An epidural

 

MEDICATIONS

Ancef two grams was given intraoperatively as this was an emergency 

section.

 

SURGEON

Bradley Lyon MD

 

ASSISTANT

OR tech

 

DRAINS

[place]oyos to gravity

 

COUNTS

Correct x3

 

 

 

 

 

FINDINGS

1. Normal uterus, tubes and ovaries.

2. Moderate meconium-stained amniotic fluid.

3. Nuchal cord x2.

4. Occiput posterior presentation of an infant female with Apgar's of 8 and 8,

     weight was 3340 grams, 7 pounds 6 ounces.

 

DESCRIPTION OF PROCEDURE

The patient taken back to the operating room, prepped and draped in the usual

sterile fashion, placed in the dorsosupine position with a wedge to her left

side.  The patient was prepped very quickly with Betadine as a  section

was done for a non-reassuring fetal heart rate tracing and then a Pfannenstiel

incision was made in the skin and taken down to the fascia.  The fascia was

nicked in the midline and extended bilaterally, taken off the rectus muscles.

THe muscles were divided in the midline.  The anterior peritoneum was entered.

The vesicouterine peritoneum was noted to be away from the field and a

transverse hysterotomy incision was made and bluntly extended bilaterally.  The

infant was delivered into the operative field.  Nuchal cord x2 was reduced and

the rest of the body was delivered and then was assessed by on the neonatology

team.  A 45-second cord clamping delay was performed before the placenta was

delivered intact.  The endometrial cavity was curetted with a moist laparotomy

sponge and the hysterotomy incision was repaired using a running locking #1

chromic suture with good hemostasis at closure.  Gutters were rendered free all

blood and blood and clot material.  0 chromic was used to plicate the abdominal

musculature in the midline and a #1 PDS was used to close the fascia.  A

subcuticular suture of 4-0 Monocryl was placed at the skin.

 

The patient tolerated procedure well.  She has taken back to the recovery room

good condition.

 

 

 

                              _________________________________

                              MD MADINA Quinn/JOHNATHAN

D:  2017/7:46 PM

T:  2017/9:40 PM

Visit #:  [id number]

Job #:  [id number]

## 2017-09-14 VITALS
HEART RATE: 89 BPM | RESPIRATION RATE: 16 BRPM | SYSTOLIC BLOOD PRESSURE: 118 MMHG | DIASTOLIC BLOOD PRESSURE: 72 MMHG | TEMPERATURE: 97.8 F

## 2017-09-14 RX ADMIN — IBUPROFEN PRN MG: 600 TABLET, FILM COATED ORAL at 02:30

## 2017-09-14 RX ADMIN — IBUPROFEN PRN MG: 600 TABLET, FILM COATED ORAL at 10:05

## 2017-09-14 RX ADMIN — STANDARDIZED SENNA CONCENTRATE AND DOCUSATE SODIUM PRN TAB: 8.6; 5 TABLET, FILM COATED ORAL at 02:29

## 2017-09-14 RX ADMIN — OXYCODONE HYDROCHLORIDE AND ACETAMINOPHEN PRN TAB: 5; 325 TABLET ORAL at 02:29

## 2017-09-14 NOTE — HHI.OB
Subjective


Post Operative Day:  3


Remarks


Pt seen and examined this morning.Postoperative day # 3 AFVSS overnight. 

Incision not draining. Decreased lochia. Denies dysuria. No breast tenderness. 

She is feeding the baby via bottle. Appetite good. No nausea or vomiting. 

Patient has not yet had a bowel movement. + flatus Ambulating well. Denies calf 

pain or shortness of breath. Pt stated her pain is minimal. Otherwise, she is 

doing well this morning and has no other concerns. 


 (Ismael Bhakta MD R1)


Attestation


Patient seen and examined.  Agree with resident's assessment and plan.


 (Kelsi Conte MD)





Objective


Vitals/I&O





Vital Signs








  Date Time  Temp Pulse Resp B/P (MAP) Pulse Ox O2 Delivery O2 Flow Rate FiO2


 


17 19:52 98.2 84 15 116/77 (90)    


 


17 08:40 98.6 91 16 116/70 (85)    








 (Ismael Bhakta MD R1)


Result Diagram:  


17 1412





Objective Remarks


GENERAL: Well-nourished, well-developed patient.


CARDIOVASCULAR: Regular rate and rhythm without murmurs, gallops, or rubs. 


RESPIRATORY: Breath sounds equal bilaterally. No accessory muscle use.


ABDOMEN/GI: Abdomen soft, bowel sounds present. slight tenderness to palpation 

on lower abd 


   Incision: Clean, dry and intact. silver dressing


   Fundus: Firm, non-tender at umbilicus.


GENITOURINARY: Light to moderate bleeding.


EXTREMITIES: No cyanosis or edema, non-tender, without signs of DVT.


Medications and IVs





Current Medications








 Medications


  (Trade)  Dose


 Ordered  Sig/Agustin


 Route  Start Time


 Stop Time Status Last Admin


 


 Lactated Ringer's  1,000 ml @ 


 125 mls/hr  Q8H


 IV  17 16:20


    17 21:18


 


 


 Lactated Ringer's  1,000 ml @ 


 3,000 mls/hr  Q20M PRN


 IV  17 16:20


    17 17:34


 


 


 Sodium Chloride  500 ml @ 


 1,000 mls/hr  ONCE  PRN


 IV  17 16:30


     


 


 


 Sodium Chloride  1,000 ml @ 


 100 mls/hr  Q10H PRN


 IV  17 16:40


     


 


 


  (Xylocaine 1%


 Inj (50 ml))  0.1 ml  UNSCH X1  PRN


 I-DERMAL  17 16:30


 17 16:29   


 


 


  (Bicitra Liq)  30 ml  ON  CALL


 PO  17 16:30


 9/15/17 16:29   


 


 


  (Zofran Inj)  4 mg  Q6H  PRN


 IV  17 16:30


    17 15:46


 


 


  (fentaNYL INJ)  50 mcg  Q1H  PRN


 IV PUSH  17 16:30


     


 


 


  (fentaNYL INJ)  100 mcg  Q1H  PRN


 IV PUSH  17 16:30


     


 


 


 Penicillin G


 Potassium [id number]


 units/Sodium


 Chloride  100 ml @ 


 200 mls/hr  Q4H


 IV  17 20:30


    17 20:30


 


 


  (Muri-Lube Oil)  10 ml  UNSCH  PRN


 TOPICAL  17 16:30


     


 


 


 Fentanyl/


 Bupivacaine HCl  100 ml @ 0


 mls/hr  TITRATE


 EPIDURAL  17 16:00


    17 19:11


 


 


  (NS Flush)  2 ml  BID


 IV FLUSH  17 21:00


     


 


 


  (NS Flush)  2 ml  UNSCH  PRN


 IV FLUSH  17 19:45


     


 


 


  (Mylicon Chew)  80 mg  QID  PRN


 PO  17 19:45


     


 


 


  (Motrin)  600 mg  Q6H  PRN


 PO  17 19:45


    17 02:30


 


 


  (Percocet  5-325


 Mg)  1 tab  Q4H  PRN


 PO  17 19:45


     


 


 


  (Percocet  5-325


 Mg)  2 tab  Q4H  PRN


 PO  17 19:45


    17 02:29


 


 


  (Lizy-Colace)  2 tab  Q12H  PRN


 PO  17 19:45


    17 02:29


 


 


  (Zofran Inj)  4 mg  Q6H  PRN


 IV PUSH  17 19:45


    17 04:26


 


 


  (Benadryl)  50 mg  Q6H  PRN


 PO  17 09:00


     


 








 (Ismael Bhakta MD R1)





Assessment/Plan


Problem List:  


(1) Status post 


ICD Codes:  Z98.891 - History of uterine scar from previous surgery


Status:  Acute


Assessment and Plan


21 y/o female who is POD #3 s/p CXN. 


-Continue routine postpartum care. 


-Percocet and Motrin PRN pain.


-senna to help with BM


-Post-op H/H: 10/31


-S/P Ancef  2 bags post-op


-Encouraged OOB. Advised pelvic rest for 6 wks. Will need a f/u appt. in 1-2 

wks for incision check and bandage removal. 


-Re: birth ctrl, she would like discuss her options at her f/u appointment. 


-discharge today, . 








abraham Bautista MD


Discharge Planning


today


 (Ismael Bhakta MD R1)











Ismael Bhakta MD R1 Sep 14, 2017 07:58


Kelsi Conte MD Sep 14, 2017 09:05

## 2017-09-14 NOTE — HHI.DCPOC
Discharge Care Plan


Diagnosis:  


(1) Status post 


Report Symptoms to Your Doctor


-Temperature above 100.5 degrees


-Redness, of incision or excessive or foul smelling drainage


-Unusual pain or calf pain


-Increased vaginal bleeding


-Painful or difficulty urinating


-Feelings of extreme sadness or anxiety after 2 weeks


Goals to Promote Your Health


* To prevent worsening of your condition and complications


* To maintain your health at the optimal level


Directions to Meet Your Goals


*** Take your medications as prescribed


*** Follow your dietary instruction


*** Follow activity as directed


*** Ensure plenty of rest for recovery


*** Drink fluids for hydration








*** Keep your appointments as scheduled


*** Take your immunizations and boosters as scheduled


*** If your symptoms worsen call your PCP, if no PCP go to Urgent Care Center 

or Emergency Room***


*** Smoking is Dangerous to Your Health. Avoid second hand smoke***


***Call the 24-hour crisis hotline for domestic abuse at 1-987.398.9519***











Ismael Bhakta MD R1 Sep 14, 2017 07:25

## 2017-10-23 ENCOUNTER — HOSPITAL ENCOUNTER (EMERGENCY)
Dept: HOSPITAL 17 - HOBED | Age: 22
Discharge: HOME | End: 2017-10-23
Payer: MEDICAID

## 2017-10-23 VITALS — DIASTOLIC BLOOD PRESSURE: 75 MMHG | SYSTOLIC BLOOD PRESSURE: 126 MMHG | HEART RATE: 53 BPM

## 2017-10-23 VITALS — HEART RATE: 60 BPM | SYSTOLIC BLOOD PRESSURE: 127 MMHG | DIASTOLIC BLOOD PRESSURE: 70 MMHG

## 2017-10-23 VITALS — DIASTOLIC BLOOD PRESSURE: 70 MMHG | HEART RATE: 51 BPM | SYSTOLIC BLOOD PRESSURE: 129 MMHG

## 2017-10-23 VITALS — SYSTOLIC BLOOD PRESSURE: 117 MMHG | DIASTOLIC BLOOD PRESSURE: 74 MMHG | HEART RATE: 56 BPM

## 2017-10-23 VITALS — DIASTOLIC BLOOD PRESSURE: 69 MMHG | SYSTOLIC BLOOD PRESSURE: 125 MMHG | HEART RATE: 57 BPM

## 2017-10-23 VITALS — HEART RATE: 58 BPM | SYSTOLIC BLOOD PRESSURE: 122 MMHG | DIASTOLIC BLOOD PRESSURE: 75 MMHG

## 2017-10-23 VITALS — HEART RATE: 58 BPM | DIASTOLIC BLOOD PRESSURE: 66 MMHG | SYSTOLIC BLOOD PRESSURE: 113 MMHG

## 2017-10-23 VITALS
DIASTOLIC BLOOD PRESSURE: 63 MMHG | TEMPERATURE: 97.5 F | RESPIRATION RATE: 16 BRPM | OXYGEN SATURATION: 100 % | SYSTOLIC BLOOD PRESSURE: 131 MMHG | HEART RATE: 55 BPM

## 2017-10-23 VITALS — TEMPERATURE: 98.2 F

## 2017-10-23 VITALS — HEART RATE: 55 BPM | SYSTOLIC BLOOD PRESSURE: 129 MMHG | DIASTOLIC BLOOD PRESSURE: 75 MMHG

## 2017-10-23 DIAGNOSIS — R82.90: ICD-10-CM

## 2017-10-23 LAB
AMORPHOUS SEDIMENT, URINE: (no result)
COLOR UR: YELLOW
GLUCOSE UR STRIP-MCNC: (no result) MG/DL
HGB UR QL STRIP: (no result)
KETONES UR STRIP-MCNC: (no result) MG/DL
MUCOUS THREADS #/AREA URNS LPF: (no result) /LPF
NITRITE UR QL STRIP: (no result)
SP GR UR STRIP: 1.01 (ref 1–1.03)
SQUAMOUS #/AREA URNS HPF: 1 /HPF (ref 0–5)
URINE LEUKOCYTE ESTERASE: (no result)

## 2017-10-23 PROCEDURE — 99283 EMERGENCY DEPT VISIT LOW MDM: CPT

## 2017-10-23 PROCEDURE — 87086 URINE CULTURE/COLONY COUNT: CPT

## 2017-10-23 PROCEDURE — 81001 URINALYSIS AUTO W/SCOPE: CPT

## 2017-10-23 NOTE — PD
HPI


Chief Complaint


Elevated BP reading in clinic


Date Seen:  Oct 23, 2017


Time Seen:  12:15


 (Amarjit Miguel MD R2)





Travel History


International Travel<30 Days:  No


Contact w/Intl Traveler<30Days:  No


 (Amarjit Miguel MD R2)





History of Present Illness


HPI


22-year-old  at approximately 5 weeks postpartum (delivery date 2017) from  due to fetal distress presenting for an elevated blood 

pressure reading in clinic. Today she feels well. Denies headache, vision 

changes, epigastric pain. Denies vaginal bleeding, chest pain, shortness of 

breath.


 (Amarjit Miguel MD R2)





History


Past Medical History


Medical History:  Denies Significant Hx


 (Amarjit Miguel MD R2)





Obstetric History


Obstetric History





 at 40 weeks due to nonreassuring fetal heart tones


 (Amarjit Miguel MD R2)





Past Surgical History


*** Narrative Surgical


C/S


 (Amarjit Miguel MD R2)





Family History


Family History:  Negative


 (Amarjit Miguel MD R2)





Social History


Alcohol Use:  No


Tobacco Use:  No


Substance Abuse:  No


 (Amarjit Miguel MD R2)





Allergies-Medications


(Allergen,Severity, Reaction):  


Coded Allergies:  


     No Known Allergies (Unverified , 17)


Home Meds


Active Scripts


Sennosides-Docusate Sodium (Senna Plus 8.6-50 mg) 8.6 Mg-50 Mg Tab, 2 TAB PO 

Q12H Y for CONSTIPATION, #60 TAB


   Prov:Ismael Bhakta MD, R1         17


Oxycodone-Acetaminophen (Oxycodone-Acetaminophen) 5-325 mg Tab, 1 TAB PO Q4H Y 

for PAIN SCALE 6 TO 10, #20 TAB


   Prov:Ismael Bhakta MD, R1         17


Ibuprofen (Ibuprofen) 600 Mg Tab, 600 MG PO Q6H Y for POSTPARTUM CRAMPING, #30 

TAB


   Prov:Ismael Bhakta MD, R1         17





Review of Systems


Except as stated in HPI:  all other systems reviewed are Neg


 (Amarjit Miguel MD R2)





Physical Exam





Vital Signs








  Date Time  Temp Pulse Resp B/P (MAP) Pulse Ox O2 Delivery O2 Flow Rate FiO2


 


10/23/17 13:46  57  125/69 (87)    


 


10/23/17 13:30  60  127/70 (89)    


 


10/23/17 13:16  58  113/66 (82)    


 


10/23/17 12:45  58  122/75 (91)    


 


10/23/17 12:35 98.2       


 


10/23/17 12:31  53      


 


10/23/17 12:31    126/75 (92)    


 


10/23/17 12:30  51  129/70 (89)    


 


10/23/17 12:08  55  129/75 (93)    


 


10/23/17 11:41 98.0 55 15 161/77 (105) 100   








Narrative


GENERAL: Well-developed, well-nourished female sitting up in bed in no acute 

distress


SKIN: No rashes, ecchymoses or lesions. Cool and dry.


HEAD: NC/AT


EYES: No conjunctival injection or drainage. 


ENT: MMM


NECK: No JVD.


CARDIOVASCULAR: NRRR. Normal S1/S2. No MRG


RESPIRATORY: CTAB. No crackles or wheezes. 


GASTROINTESTINAL: Abdomen soft, non-distended, non-tender. No hepato-

splenomegaly or palpable masses. 


MUSCULOSKELETAL: Extremities without clubbing, cyanosis, or edema.


NEUROLOGICAL: Awake and alert. Cranial nerves II through XII grossly intact. 

Moves all extremities without difficulty. No hyperreflexia at biceps on R side. 

Normal speech.


 (Amarjit Miguel MD R2)





Data


Data


Vital Signs Reviewed:  Yes


Orders





 Orders


Vital Signs (Adult) .ON ADMISSION (10/23/17 12:20)


Urinalysis - C+S If Indicated (10/23/17 12:20)


Urine Culture (10/23/17 12:20)


Labs





Vital Signs








  Date Time  Temp Pulse Resp B/P (MAP) Pulse Ox O2 Delivery O2 Flow Rate FiO2


 


10/23/17 13:46  57  125/69 (87)    


 


10/23/17 12:35 98.2       


 


10/23/17 11:41   15  100   








Laboratory Tests








Test


  10/23/17


12:15


 


Urine Color YELLOW 


 


Urine Turbidity CLEAR 


 


Urine pH 6.0 


 


Urine Specific Gravity 1.015 


 


Urine Protein NEG mg/dL 


 


Urine Glucose (UA) NEG mg/dL 


 


Urine Ketones NEG mg/dL 


 


Urine Occult Blood NEG 


 


Urine Nitrite POS 


 


Urine Bilirubin NEG 


 


Urine Urobilinogen


  LESS THAN 2.0


MG/DL


 


Urine Leukocyte Esterase NEG 


 


Urine RBC 2 /hpf 


 


Urine WBC 1 /hpf 


 


Urine Squamous Epithelial


Cells 1 /hpf 


 


 


Urine Amorphous Sediment RARE 


 


Urine Mucus FEW /lpf 


 


Microscopic Urinalysis Comment  








 (Amarjit Miguel MD R2)





MDM


Medical Record Reviewed:  Yes


Narrative Course / MDM


23 yo  at ~5 weeks postpartum presenting for elevated BP reading





#1 Normal postpartum course


BP readings Q15 min normal x6. Patient asymptomatic.


- Routine f/u with PCP





abraham Brooks


 (Amarjit Miguel MD R2)





Diagnosis


Diagnosis:  


 Primary Impression:  


 Normal postpartum course


Disposition:  01 DISCHARGE HOME


Condition:  Good





Collaborating MD Comments


Patient with elevated bp in [facility] office.  Normotensive here and PI[facility] 

work up is negative.


Discharge home.


Seen and evaluated with resident


 (Salima Brooks MD)











Amarjit Miguel MD R2 Oct 23, 2017 14:22


Salima Brooks MD Oct 23, 2017 17:46

## 2017-10-23 NOTE — PD
HPI


Chief Complaint


Elevated BP reading in clinic


Date Seen:  Oct 23, 2017


Time Seen:  12:15


 (Amarjit Miguel MD R2)





Travel History


International Travel<30 Days:  No


Contact w/Intl Traveler<30Days:  No


 (Amarjit Miguel MD R2)





History of Present Illness


HPI


22-year-old  at approximately 5 weeks postpartum (delivery date 2017) from  due to fetal distress presenting for an elevated blood 

pressure reading in clinic. Today she feels well. Denies headache, vision 

changes, epigastric pain. Denies vaginal bleeding, chest pain, shortness of 

breath.


 (Amarjit Miguel MD R2)





History


Past Medical History


Medical History:  Denies Significant Hx


 (Amarjit Miguel MD R2)





Obstetric History


Obstetric History





 at 40 weeks due to nonreassuring fetal heart tones


 (Amarjit Miguel MD R2)





Past Surgical History


*** Narrative Surgical


C/S


 (Amarjit Miguel MD R2)





Family History


Family History:  Negative


 (Amarjit Miguel MD R2)





Social History


Alcohol Use:  No


Tobacco Use:  No


Substance Abuse:  No


 (Amarjit Miguel MD R2)





Allergies-Medications


(Allergen,Severity, Reaction):  


Coded Allergies:  


     No Known Allergies (Unverified , 17)


Home Meds


Active Scripts


Sennosides-Docusate Sodium (Senna Plus 8.6-50 mg) 8.6 Mg-50 Mg Tab, 2 TAB PO 

Q12H Y for CONSTIPATION, #60 TAB


   Prov:Ismael Bhakta MD, R1         17


Oxycodone-Acetaminophen (Oxycodone-Acetaminophen) 5-325 mg Tab, 1 TAB PO Q4H Y 

for PAIN SCALE 6 TO 10, #20 TAB


   Prov:Ismael Bhakta MD, R1         17


Ibuprofen (Ibuprofen) 600 Mg Tab, 600 MG PO Q6H Y for POSTPARTUM CRAMPING, #30 

TAB


   Prov:Ismael Bhakta MD, R1         17





Review of Systems


Except as stated in HPI:  all other systems reviewed are Neg


 (Amarjit Miguel MD R2)





Physical Exam





Vital Signs








  Date Time  Temp Pulse Resp B/P (MAP) Pulse Ox O2 Delivery O2 Flow Rate FiO2


 


10/23/17 13:46  57  125/69 (87)    


 


10/23/17 13:30  60  127/70 (89)    


 


10/23/17 13:16  58  113/66 (82)    


 


10/23/17 12:45  58  122/75 (91)    


 


10/23/17 12:35 98.2       


 


10/23/17 12:31  53      


 


10/23/17 12:31    126/75 (92)    


 


10/23/17 12:30  51  129/70 (89)    


 


10/23/17 12:08  55  129/75 (93)    


 


10/23/17 11:41 98.0 55 15 161/77 (105) 100   








Narrative


GENERAL: Well-developed, well-nourished female sitting up in bed in no acute 

distress


SKIN: No rashes, ecchymoses or lesions. Cool and dry.


HEAD: NC/AT


EYES: No conjunctival injection or drainage. 


ENT: MMM


NECK: No JVD.


CARDIOVASCULAR: NRRR. Normal S1/S2. No MRG


RESPIRATORY: CTAB. No crackles or wheezes. 


GASTROINTESTINAL: Abdomen soft, non-distended, non-tender. No hepato-

splenomegaly or palpable masses. 


MUSCULOSKELETAL: Extremities without clubbing, cyanosis, or edema.


NEUROLOGICAL: Awake and alert. Cranial nerves II through XII grossly intact. 

Moves all extremities without difficulty. No hyperreflexia at biceps on R side. 

Normal speech.


 (Amarjit Miguel MD R2)





Data


Data


Vital Signs Reviewed:  Yes


Orders





 Orders


Vital Signs (Adult) .ON ADMISSION (10/23/17 12:20)


Urinalysis - C+S If Indicated (10/23/17 12:20)


Urine Culture (10/23/17 12:20)


Labs





Vital Signs








  Date Time  Temp Pulse Resp B/P (MAP) Pulse Ox O2 Delivery O2 Flow Rate FiO2


 


10/23/17 13:46  57  125/69 (87)    


 


10/23/17 12:35 98.2       


 


10/23/17 11:41   15  100   








Laboratory Tests








Test


  10/23/17


12:15


 


Urine Color YELLOW 


 


Urine Turbidity CLEAR 


 


Urine pH 6.0 


 


Urine Specific Gravity 1.015 


 


Urine Protein NEG mg/dL 


 


Urine Glucose (UA) NEG mg/dL 


 


Urine Ketones NEG mg/dL 


 


Urine Occult Blood NEG 


 


Urine Nitrite POS 


 


Urine Bilirubin NEG 


 


Urine Urobilinogen


  LESS THAN 2.0


MG/DL


 


Urine Leukocyte Esterase NEG 


 


Urine RBC 2 /hpf 


 


Urine WBC 1 /hpf 


 


Urine Squamous Epithelial


Cells 1 /hpf 


 


 


Urine Amorphous Sediment RARE 


 


Urine Mucus FEW /lpf 


 


Microscopic Urinalysis Comment  








 (Amarjit Miguel MD R2)





MDM


Medical Record Reviewed:  Yes


Narrative Course / MDM


21 yo  at ~5 weeks postpartum presenting for elevated BP reading





#1 Normal postpartum course


BP readings Q15 min normal x6. Patient asymptomatic.


- Routine f/u with PCP





abraham Brooks


 (Amarjit Miguel MD R2)





Diagnosis


Diagnosis:  


 Primary Impression:  


 Normal postpartum course


Disposition:  01 DISCHARGE HOME


Condition:  Good





Collaborating MD Comments


Patient with elevated bp in [facility] office.  Normotensive here and [facility] 

work up is negative.


Discharge home.


Seen and evaluated with resident


 (Salima Brooks MD)











Amarjit Miguel MD R2 Oct 23, 2017 14:22


Salima Brooks MD Oct 23, 2017 17:46

## 2018-02-21 ENCOUNTER — HOSPITAL ENCOUNTER (EMERGENCY)
Dept: HOSPITAL 17 - NEPD | Age: 23
Discharge: HOME | End: 2018-02-21
Payer: MEDICAID

## 2018-02-21 VITALS
TEMPERATURE: 97.8 F | DIASTOLIC BLOOD PRESSURE: 88 MMHG | HEART RATE: 113 BPM | RESPIRATION RATE: 14 BRPM | OXYGEN SATURATION: 100 % | SYSTOLIC BLOOD PRESSURE: 136 MMHG

## 2018-02-21 DIAGNOSIS — R10.9: ICD-10-CM

## 2018-02-21 DIAGNOSIS — R51: ICD-10-CM

## 2018-02-21 DIAGNOSIS — R11.2: Primary | ICD-10-CM

## 2018-02-21 LAB
BACTERIA #/AREA URNS HPF: (no result) /HPF
BASOPHILS # BLD AUTO: 0.1 TH/MM3 (ref 0–0.2)
BASOPHILS NFR BLD: 0.5 % (ref 0–2)
BUN SERPL-MCNC: 11 MG/DL (ref 7–18)
BUN SERPL-MCNC: 9 MG/DL (ref 7–18)
CALCIUM SERPL-MCNC: 10.1 MG/DL (ref 8.5–10.1)
CALCIUM SERPL-MCNC: 8.5 MG/DL (ref 8.5–10.1)
CHLORIDE SERPL-SCNC: 101 MEQ/L (ref 98–107)
CHLORIDE SERPL-SCNC: 107 MEQ/L (ref 98–107)
COLOR UR: YELLOW
CREAT SERPL-MCNC: 0.51 MG/DL (ref 0.5–1)
CREAT SERPL-MCNC: 0.73 MG/DL (ref 0.5–1)
EOSINOPHIL # BLD: 0 TH/MM3 (ref 0–0.4)
EOSINOPHIL NFR BLD: 0.1 % (ref 0–4)
ERYTHROCYTE [DISTWIDTH] IN BLOOD BY AUTOMATED COUNT: 14.3 % (ref 11.6–17.2)
GFR SERPLBLD BASED ON 1.73 SQ M-ARVRAT: 121 ML/MIN (ref 89–?)
GFR SERPLBLD BASED ON 1.73 SQ M-ARVRAT: 182 ML/MIN (ref 89–?)
GLUCOSE SERPL-MCNC: 66 MG/DL (ref 74–106)
GLUCOSE SERPL-MCNC: 79 MG/DL (ref 74–106)
GLUCOSE UR STRIP-MCNC: (no result) MG/DL
HCO3 BLD-SCNC: 14.2 MEQ/L (ref 21–32)
HCO3 BLD-SCNC: 18.5 MEQ/L (ref 21–32)
HCT VFR BLD CALC: 43.5 % (ref 35–46)
HGB BLD-MCNC: 14.7 GM/DL (ref 11.6–15.3)
HGB UR QL STRIP: (no result)
HYALINE CASTS #/AREA URNS LPF: 10 /LPF
KETONES UR STRIP-MCNC: 150 MG/DL
LYMPHOCYTES # BLD AUTO: 1.6 TH/MM3 (ref 1–4.8)
LYMPHOCYTES NFR BLD AUTO: 13.4 % (ref 9–44)
MCH RBC QN AUTO: 28.4 PG (ref 27–34)
MCHC RBC AUTO-ENTMCNC: 33.9 % (ref 32–36)
MCV RBC AUTO: 83.7 FL (ref 80–100)
MONOCYTE #: 0.4 TH/MM3 (ref 0–0.9)
MONOCYTES NFR BLD: 3.8 % (ref 0–8)
MUCOUS THREADS #/AREA URNS LPF: (no result) /LPF
NEUTROPHILS # BLD AUTO: 9.7 TH/MM3 (ref 1.8–7.7)
NEUTROPHILS NFR BLD AUTO: 82.2 % (ref 16–70)
NITRITE UR QL STRIP: (no result)
PLATELET # BLD: 305 TH/MM3 (ref 150–450)
PMV BLD AUTO: 9.1 FL (ref 7–11)
RBC # BLD AUTO: 5.2 MIL/MM3 (ref 4–5.3)
SODIUM SERPL-SCNC: 133 MEQ/L (ref 136–145)
SODIUM SERPL-SCNC: 135 MEQ/L (ref 136–145)
SP GR UR STRIP: 1.03 (ref 1–1.03)
SQUAMOUS #/AREA URNS HPF: 16 /HPF (ref 0–5)
URINE LEUKOCYTE ESTERASE: (no result)
WBC # BLD AUTO: 11.8 TH/MM3 (ref 4–11)

## 2018-02-21 PROCEDURE — 85025 COMPLETE CBC W/AUTO DIFF WBC: CPT

## 2018-02-21 PROCEDURE — 96361 HYDRATE IV INFUSION ADD-ON: CPT

## 2018-02-21 PROCEDURE — 80048 BASIC METABOLIC PNL TOTAL CA: CPT

## 2018-02-21 PROCEDURE — 99284 EMERGENCY DEPT VISIT MOD MDM: CPT

## 2018-02-21 PROCEDURE — 81001 URINALYSIS AUTO W/SCOPE: CPT

## 2018-02-21 PROCEDURE — 86901 BLOOD TYPING SEROLOGIC RH(D): CPT

## 2018-02-21 PROCEDURE — 84702 CHORIONIC GONADOTROPIN TEST: CPT

## 2018-02-21 PROCEDURE — 96360 HYDRATION IV INFUSION INIT: CPT

## 2018-02-21 NOTE — PD
HPI


Chief Complaint:  Pregnancy Related Problem


Time Seen by Provider:  18:24


Travel History


International Travel<30 days:  No


Contact w/Intl Traveler<30days:  No


Traveled to known affect area:  No





History of Present Illness


HPI


22-year-old female  approximately 2 months pregnant, LMP in 2017, 

here for evaluation of nausea, vomiting, generalized malaise.  Patient reports 

2 weeks of vomiting.  States her emesis is clear and sometimes bilious.  She 

occasionally has some abdominal cramping.  She states she is unable to keep 

anything down.  She also has a slight headache.  No fevers or chills.  No cough 

or upper respiratory symptoms.  She believes she may be dehydrated.





PFSH


Past Medical History


Pregnant?:  Pregnant


LMP:  2017





Social History


Alcohol Use:  No


Tobacco Use:  No





Allergies-Medications


(Allergen,Severity, Reaction):  


Coded Allergies:  


     No Known Allergies (Unverified , 17)


Reported Meds & Prescriptions





Reported Meds & Active Scripts


Active


Ibuprofen 600 Mg Tab 600 Mg PO Q6H PRN








Review of Systems


Except as stated in HPI:  all other systems reviewed are Neg





Physical Exam


Narrative


GENERAL: Well-developed, well-nourished, comfortable, no apparent distress.


SKIN: Focused skin assessment warm/dry.


HEAD: Atraumatic. Normocephalic. 


EYES: Pupils equal and round. No scleral icterus. No injection or drainage. 


ENT: No nasal bleeding or discharge.  Mucous membranes pink and dry.


NECK: Trachea midline. No JVD. 


CARDIOVASCULAR: Regular rate and rhythm.  


RESPIRATORY: No accessory muscle use. Clear to auscultation. Breath sounds 

equal bilaterally. 


GASTROINTESTINAL: Abdomen soft, non-tender, nondistended. 


MUSCULOSKELETAL: No obvious deformities. No clubbing.  No cyanosis.  No edema. 


NEUROLOGICAL: Awake and alert. No obvious cranial nerve deficits.  Motor 

grossly within normal limits. Normal speech.


PSYCHIATRIC: Appropriate mood and affect; insight and judgment normal.





Data


Data


Last Documented VS





Vital Signs








  Date Time  Temp Pulse Resp B/P (MAP) Pulse Ox O2 Delivery O2 Flow Rate FiO2


 


18 15:43 97.8 113 14 136/88 (104) 100   








Orders





 Orders


Urinalysis - C+S If Indicated (18 16:24)


Complete Blood Count With Diff (18 16:24)


Basic Metabolic Panel (Bmp) (18 16:24)


Complete Rh (18 16:24)


Beta Hcg (Quant/Titer) (18 16:24)


Sodium Chlor 0.9% 1000 Ml Inj (Ns 1000 M (18 18:30)


Sodium Chlor 0.9% 1000 Ml Inj (Ns 1000 M (18 18:30)


Ondansetron Inj (Zofran Inj) (18 18:30)


Acetaminophen (Tylenol) (18 19:00)


Basic Metabolic Panel (Bmp) (18 20:10)


Nitrofurantoin Monohyd Macrocr (Macrobid (18 20:30)





Labs





Laboratory Tests








Test


  18


16:15 18


17:04 18


21:20


 


Urine Color YELLOW   


 


Urine Turbidity HAZY   


 


Urine pH 6.0   


 


Urine Specific Gravity 1.035   


 


Urine Protein 100 mg/dL   


 


Urine Glucose (UA) NEG mg/dL   


 


Urine Ketones 150 mg/dL   


 


Urine Occult Blood SMALL   


 


Urine Nitrite NEG   


 


Urine Bilirubin NEG   


 


Urine Urobilinogen 2.0 MG/DL   


 


Urine Leukocyte Esterase SMALL   


 


Urine RBC 17 /hpf   


 


Urine WBC 5 /hpf   


 


Urine Squamous Epithelial


Cells 16 /hpf 


  


  


 


 


Urine Bacteria OCC /hpf   


 


Urine Hyaline Casts 10 /lpf   


 


Urine Mucus MANY /lpf   


 


Microscopic Urinalysis Comment


  CULT NOT


INDICATED 


  


 


 


White Blood Count  11.8 TH/MM3  


 


Red Blood Count  5.20 MIL/MM3  


 


Hemoglobin  14.7 GM/DL  


 


Hematocrit  43.5 %  


 


Mean Corpuscular Volume  83.7 FL  


 


Mean Corpuscular Hemoglobin  28.4 PG  


 


Mean Corpuscular Hemoglobin


Concent 


  33.9 % 


  


 


 


Red Cell Distribution Width  14.3 %  


 


Platelet Count  305 TH/MM3  


 


Mean Platelet Volume  9.1 FL  


 


Neutrophils (%) (Auto)  82.2 %  


 


Lymphocytes (%) (Auto)  13.4 %  


 


Monocytes (%) (Auto)  3.8 %  


 


Eosinophils (%) (Auto)  0.1 %  


 


Basophils (%) (Auto)  0.5 %  


 


Neutrophils # (Auto)  9.7 TH/MM3  


 


Lymphocytes # (Auto)  1.6 TH/MM3  


 


Monocytes # (Auto)  0.4 TH/MM3  


 


Eosinophils # (Auto)  0.0 TH/MM3  


 


Basophils # (Auto)  0.1 TH/MM3  


 


CBC Comment  DIFF FINAL  


 


Differential Comment    


 


Blood Urea Nitrogen  11 MG/DL  9 MG/DL 


 


Creatinine  0.73 MG/DL  0.51 MG/DL 


 


Random Glucose  79 MG/DL  66 MG/DL 


 


Calcium Level  10.1 MG/DL  8.5 MG/DL 


 


Sodium Level  133 MEQ/L  135 MEQ/L 


 


Potassium Level  3.8 MEQ/L  3.7 MEQ/L 


 


Chloride Level  101 MEQ/L  107 MEQ/L 


 


Carbon Dioxide Level  18.5 MEQ/L  14.2 MEQ/L 


 


Anion Gap  14 MEQ/L  14 MEQ/L 


 


Estimat Glomerular Filtration


Rate 


  121 ML/MIN 


  182 ML/MIN 


 


 


Human Chorionic Gonadotropin,


Quant 


  [id number] MIU/ML 


  


 











[facility]


Medical Decision Making


Medical Screen Exam Complete:  Yes


Emergency Medical Condition:  Yes


Differential Diagnosis


Dehydration, hyperemesis gravidarum, metabolic abnormality, acute intra-

abdominal/surgical process unlikely


Narrative Course


Vital signs show heart rate 113, blood pressure 136/88, pulse ox 100% on room 

air, tympanic temp of 97.8F.





CBC: WBC 11.8, hemoglobin 14.7, hematocrit 43.5, platelets 305.


BMP is remarkable for bicarbonate 18.5, otherwise essentially unremarkable.


Beta hCG is 158,843.





UA: Hazy, 100 protein, 150 ketones, small occult blood, small leukocyte esterase

, occasional bacteria, many mucus.





Bedside transabdominal ultrasound performed by me shows an IUP with fetal heart 

rate of 176 bpm.





Repeat heart rate after 2 L normal saline IV is 83.





Repeat chemistry was performed and shows that her bicarbonate actually worsened 

to 14.2.  This may be secondary to receiving normal saline IV.  On reassessment 

the patient states she feels significantly improved and is tolerating clear 

liquids in the emergency department.  Patient prefers to be discharged home and 

will follow up as an outpatient.  She states she has a midwife.  I advised that 

she find an OB/GYN physician to follow up with this week.  I will give her a 

prescription for Macrobid for her bacteriuria during pregnancy as well as a 

prescription for Zofran.  She was informed on when to return to the emergency 

department.  She verbalizes understanding and agreement with plan.





Procedures


**Procedure Narrative**


Bedside transabdominal ultrasound:


Using the curvilinear ultrasound probe, a bedside transabdominal ultrasound was 

performed by me and shows an IUP with a fetal heart rate 176 bpm.





Diagnosis





 Primary Impression:  


 Pregnancy


 Qualified Codes:  Z34.90 - Encounter for supervision of normal pregnancy, 

unspecified, unspecified trimester


 Additional Impressions:  


 Bacteriuria during pregnancy


 Nausea and vomiting during pregnancy


Referrals:  


[facility]


3 days





***Additional Instructions:  


Follow-up with an OB/GYN physician this week.


Return to the emergency department for worsening symptoms or any other concerns.


Scripts


Nitrofurantoin Monohydrate Macrocrystals (Macrobid) 100 Mg Cap


100 MG PO BID for Infection for 5 Days, #10 CAP 0 Refills


   Prov: Juan A Hale MD         18 


Ondansetron Odt (Zofran Odt) 4 Mg Tab


4 MG SL Q8HR Y for Nausea/Vomiting, #10 TAB 0 Refills


   Prov: Juan A Hale MD         18


Disposition:  01 DISCHARGE HOME


Condition:  Stable











Juan A Hale MD 2018 18:39